# Patient Record
Sex: MALE | Race: WHITE | NOT HISPANIC OR LATINO | Employment: FULL TIME | URBAN - METROPOLITAN AREA
[De-identification: names, ages, dates, MRNs, and addresses within clinical notes are randomized per-mention and may not be internally consistent; named-entity substitution may affect disease eponyms.]

---

## 2022-05-09 ENCOUNTER — HOSPITAL ENCOUNTER (EMERGENCY)
Facility: MEDICAL CENTER | Age: 32
End: 2022-05-09
Attending: EMERGENCY MEDICINE
Payer: COMMERCIAL

## 2022-05-09 VITALS
BODY MASS INDEX: 30.52 KG/M2 | RESPIRATION RATE: 16 BRPM | DIASTOLIC BLOOD PRESSURE: 94 MMHG | WEIGHT: 178.79 LBS | SYSTOLIC BLOOD PRESSURE: 136 MMHG | HEIGHT: 64 IN | TEMPERATURE: 98.6 F | HEART RATE: 80 BPM | OXYGEN SATURATION: 97 %

## 2022-05-09 DIAGNOSIS — R10.11 RIGHT UPPER QUADRANT ABDOMINAL PAIN: ICD-10-CM

## 2022-05-09 LAB
ALBUMIN SERPL BCP-MCNC: 4.8 G/DL (ref 3.2–4.9)
ALBUMIN/GLOB SERPL: 1.7 G/DL
ALP SERPL-CCNC: 80 U/L (ref 30–99)
ALT SERPL-CCNC: 25 U/L (ref 2–50)
ANION GAP SERPL CALC-SCNC: 11 MMOL/L (ref 7–16)
APPEARANCE UR: CLEAR
AST SERPL-CCNC: 22 U/L (ref 12–45)
BASOPHILS # BLD AUTO: 0.7 % (ref 0–1.8)
BASOPHILS # BLD: 0.06 K/UL (ref 0–0.12)
BILIRUB SERPL-MCNC: 0.3 MG/DL (ref 0.1–1.5)
BILIRUB UR QL STRIP.AUTO: NEGATIVE
BUN SERPL-MCNC: 12 MG/DL (ref 8–22)
CALCIUM SERPL-MCNC: 9.6 MG/DL (ref 8.4–10.2)
CHLORIDE SERPL-SCNC: 99 MMOL/L (ref 96–112)
CO2 SERPL-SCNC: 26 MMOL/L (ref 20–33)
COLOR UR: YELLOW
CREAT SERPL-MCNC: 0.98 MG/DL (ref 0.5–1.4)
EOSINOPHIL # BLD AUTO: 0.14 K/UL (ref 0–0.51)
EOSINOPHIL NFR BLD: 1.6 % (ref 0–6.9)
ERYTHROCYTE [DISTWIDTH] IN BLOOD BY AUTOMATED COUNT: 44.1 FL (ref 35.9–50)
GFR SERPLBLD CREATININE-BSD FMLA CKD-EPI: 105 ML/MIN/1.73 M 2
GLOBULIN SER CALC-MCNC: 2.9 G/DL (ref 1.9–3.5)
GLUCOSE SERPL-MCNC: 89 MG/DL (ref 65–99)
GLUCOSE UR STRIP.AUTO-MCNC: NEGATIVE MG/DL
HCT VFR BLD AUTO: 48.9 % (ref 42–52)
HGB BLD-MCNC: 16 G/DL (ref 14–18)
IMM GRANULOCYTES # BLD AUTO: 0.04 K/UL (ref 0–0.11)
IMM GRANULOCYTES NFR BLD AUTO: 0.5 % (ref 0–0.9)
KETONES UR STRIP.AUTO-MCNC: NEGATIVE MG/DL
LEUKOCYTE ESTERASE UR QL STRIP.AUTO: NEGATIVE
LIPASE SERPL-CCNC: 14 U/L (ref 7–58)
LYMPHOCYTES # BLD AUTO: 3.1 K/UL (ref 1–4.8)
LYMPHOCYTES NFR BLD: 35.6 % (ref 22–41)
MCH RBC QN AUTO: 27.6 PG (ref 27–33)
MCHC RBC AUTO-ENTMCNC: 32.7 G/DL (ref 33.7–35.3)
MCV RBC AUTO: 84.5 FL (ref 81.4–97.8)
MICRO URNS: NORMAL
MONOCYTES # BLD AUTO: 0.73 K/UL (ref 0–0.85)
MONOCYTES NFR BLD AUTO: 8.4 % (ref 0–13.4)
NEUTROPHILS # BLD AUTO: 4.63 K/UL (ref 1.82–7.42)
NEUTROPHILS NFR BLD: 53.2 % (ref 44–72)
NITRITE UR QL STRIP.AUTO: NEGATIVE
NRBC # BLD AUTO: 0 K/UL
NRBC BLD-RTO: 0 /100 WBC
PH UR STRIP.AUTO: 6 [PH] (ref 5–8)
PLATELET # BLD AUTO: 314 K/UL (ref 164–446)
PMV BLD AUTO: 10.4 FL (ref 9–12.9)
POTASSIUM SERPL-SCNC: 4.4 MMOL/L (ref 3.6–5.5)
PROT SERPL-MCNC: 7.7 G/DL (ref 6–8.2)
PROT UR QL STRIP: NEGATIVE MG/DL
RBC # BLD AUTO: 5.79 M/UL (ref 4.7–6.1)
RBC UR QL AUTO: NEGATIVE
SODIUM SERPL-SCNC: 136 MMOL/L (ref 135–145)
SP GR UR STRIP.AUTO: 1.02
WBC # BLD AUTO: 8.7 K/UL (ref 4.8–10.8)

## 2022-05-09 PROCEDURE — 36415 COLL VENOUS BLD VENIPUNCTURE: CPT

## 2022-05-09 PROCEDURE — 99284 EMERGENCY DEPT VISIT MOD MDM: CPT

## 2022-05-09 PROCEDURE — 83690 ASSAY OF LIPASE: CPT

## 2022-05-09 PROCEDURE — 81003 URINALYSIS AUTO W/O SCOPE: CPT

## 2022-05-09 PROCEDURE — 85025 COMPLETE CBC W/AUTO DIFF WBC: CPT

## 2022-05-09 PROCEDURE — 80053 COMPREHEN METABOLIC PANEL: CPT

## 2022-05-09 RX ORDER — OMEPRAZOLE 40 MG/1
40 CAPSULE, DELAYED RELEASE ORAL DAILY
Qty: 30 CAPSULE | Refills: 0 | Status: SHIPPED | OUTPATIENT
Start: 2022-05-09 | End: 2022-05-09 | Stop reason: SDUPTHER

## 2022-05-09 RX ORDER — OMEPRAZOLE 40 MG/1
40 CAPSULE, DELAYED RELEASE ORAL DAILY
Qty: 30 CAPSULE | Refills: 0 | Status: SHIPPED | OUTPATIENT
Start: 2022-05-09

## 2022-05-09 NOTE — DISCHARGE INSTRUCTIONS
Gastritis    Avoid all ibuprofen and naproxen, aspirin and alcohol if possible for 4 weeks.  Take Prilosec 40 mg a day for 4 weeks.  Return for worsening pain, bleeding or pain and fever.  Followup with your doctor or GI if not better in 3-4 days.   Take maalox and Tylenol if needed until acid blocker takes effect.  If and when you resume alcohol use, try to limit it to no more than 4 drinks a day.    You had a borderline or high normal blood pressure reading today.  This does not necessarily mean you have hypertension.  Please followup with your/a primary physician for comprehensive blood pressure evaluation and yearly fasting cholesterol assessment.  BP Readings from Last 3 Encounters:   05/09/22 130/85         You have gastritis. Gastritis is an irritation of the stomach. This is often caused by medications, but can be from anything that bothers the stomach.   Other stomach irritants are:  Alcohol.  Caffeine.  Nicotine.  Spicy or acid foods.     Medications for pain and arthritis. Aspirin and other anti-inflammatory medicines such as ibuprofen (Advil), naproxen (Aleve), and ketoprofen (Orudis) can be highly irritating.  Emotional distress.     Symptoms of gastritis may include:  Abdominal pain. Indigestion. Nausea and or vomiting. Bleeding.      Some patients with chronic gastritis and ulcers have been infected by a germ. They may need special testing. Medications which kill germs can be used to cure this condition.    Treatment includes avoiding the substances mentioned above that are known to cause stomach trouble.    Medications used to treat gastritis can include:  Antacids.  Medicines to control vomiting.   Acid blocking medicines (Axid, Pepcid, Prevacid, Prilosec, Tagamet, Zantac).     Symptoms of gastritis usually improve within 2-3 days of starting treatment. Call your caregiver if you are not better in a few days.    Seek medical care if you:    Have increased stomach pain or chest pain.  Vomit  blood.  Faint or feel light headed. Can not keep fluids down.  Pass bloody or black stools.  Develop severe back pain.     Document Released: 12/18/2006  Document Re-Released: 06/30/2008  [email or web address]® Patient Information ©2008 [employer].

## 2022-05-09 NOTE — ED PROVIDER NOTES
ED Provider Note    CHIEF COMPLAINT  Chief Complaint   Patient presents with   • Abdominal Pain     Pt c/o intermittent Rt sided ABD Pn that's been going on for the past couple weeks now   • Nausea     Going on and off for the past couple days;   • Flank Pain     Bilateral; going on for past couple of weeks;       HPI  Dustin Nelson is a 31 y.o. male who presents with abdominal pain in the right mid abdomen present intermittently for 2 weeks and nearly constant the last 2 to 3 days.  Denies fever.  He has had nausea and vomited once yesterday.  He has soft stools but no diarrhea or constipation.  Denies dysuria urgency or frequency.  No hematuria.  No surgeries.  No pancreatitis gastritis or peptic ulcer disease.  Denies NSAID use.  He does drink about 6 alcoholic beverages per day.  Had intermittent back pain bilateral as well the last couple of weeks.    REVIEW OF SYSTEMS  Pertinent positives include: Abdominal pain, nausea, vomiting, back pain.  Pertinent negatives include: Fever, cough, sore throat, headache, body aches, rash, swelling, testicle pain.  10+ systems reviewed and negative.      PAST MEDICAL HISTORY  Denies    FAMILY HISTORY  Alcoholism    SOCIAL HISTORY  Social History     Tobacco Use   • Smoking status: Heavy Tobacco Smoker     Packs/day: 0.50     Types: Cigarettes   • Smokeless tobacco: Never Used   Vaping Use   • Vaping Use: Never used   Substance Use Topics   • Alcohol use: Yes     Comment: Daily;   • Drug use: Yes     Types: Inhaled     Comment: THC;     Social History     Substance and Sexual Activity   Drug Use Yes   • Types: Inhaled    Comment: THC;       SURGICAL HISTORY  History reviewed. No pertinent surgical history.    CURRENT MEDICATIONS  No current facility-administered medications for this encounter.     Current Outpatient Medications   Medication Sig Dispense Refill   • omeprazole (PRILOSEC) 40 MG delayed-release capsule Take 1 Capsule by mouth every day. 30 Capsule 0  "      ALLERGIES  No Known Allergies    PHYSICAL EXAM  VITAL SIGNS: /85   Pulse 76   Temp 36.9 °C (98.4 °F) (Temporal)   Resp 16   Ht 1.626 m (5' 4\")   Wt 81.1 kg (178 lb 12.7 oz)   SpO2 95%   BMI 30.69 kg/m²   Reviewed and afebrile  Constitutional: Well developed, Well nourished, clearly well-appearing.  HENT: Normocephalic, atraumatic, bilateral external ears normal, Wearing mask.   Eyes: PERRLA, conjunctiva pink, no scleral icterus.   Cardiovascular: Regular S1-S2 without murmur, rub, gallop.  No dependent edema or calf tenderness.  Respiratory: No rales, rhonchi, wheeze or cough.  Gastrointestinal: Soft, no tenderness in the right lower quadrant, extremely minimal tenderness right upper quadrant.  No rebound or guarding., nondistended, no organomegaly.  Skin: No erythema, no rash.   Genitourinary: Very minimal bilateral low costovertebral angle tenderness.   Neurologic: Alert & oriented x 3, cranial nerves 2-12 intact by passive exam.  No focal deficit noted.  Psychiatric: Affect normal, Judgment normal, Mood normal.     DIFFERENTIAL DIAGNOSIS:  Alcoholic gastritis, pancreatitis, cholecystitis, appendicitis, pyelonephritis, renal colic.    LABORATORY:  Results for orders placed or performed during the hospital encounter of 05/09/22   CBC WITH DIFFERENTIAL   Result Value Ref Range    WBC 8.7 4.8 - 10.8 K/uL    RBC 5.79 4.70 - 6.10 M/uL    Hemoglobin 16.0 14.0 - 18.0 g/dL    Hematocrit 48.9 42.0 - 52.0 %    MCV 84.5 81.4 - 97.8 fL    MCH 27.6 27.0 - 33.0 pg    MCHC 32.7 (L) 33.7 - 35.3 g/dL    RDW 44.1 35.9 - 50.0 fL    Platelet Count 314 164 - 446 K/uL    MPV 10.4 9.0 - 12.9 fL    Neutrophils-Polys 53.20 44.00 - 72.00 %    Lymphocytes 35.60 22.00 - 41.00 %    Monocytes 8.40 0.00 - 13.40 %    Eosinophils 1.60 0.00 - 6.90 %    Basophils 0.70 0.00 - 1.80 %    Immature Granulocytes 0.50 0.00 - 0.90 %    Nucleated RBC 0.00 /100 WBC    Neutrophils (Absolute) 4.63 1.82 - 7.42 K/uL    Lymphs (Absolute) 3.10 " 1.00 - 4.80 K/uL    Monos (Absolute) 0.73 0.00 - 0.85 K/uL    Eos (Absolute) 0.14 0.00 - 0.51 K/uL    Baso (Absolute) 0.06 0.00 - 0.12 K/uL    Immature Granulocytes (abs) 0.04 0.00 - 0.11 K/uL    NRBC (Absolute) 0.00 K/uL   COMP METABOLIC PANEL   Result Value Ref Range    Sodium 136 135 - 145 mmol/L    Potassium 4.4 3.6 - 5.5 mmol/L    Chloride 99 96 - 112 mmol/L    Co2 26 20 - 33 mmol/L    Anion Gap 11.0 7.0 - 16.0    Glucose 89 65 - 99 mg/dL    Bun 12 8 - 22 mg/dL    Creatinine 0.98 0.50 - 1.40 mg/dL    Calcium 9.6 8.4 - 10.2 mg/dL    AST(SGOT) 22 12 - 45 U/L    ALT(SGPT) 25 2 - 50 U/L    Alkaline Phosphatase 80 30 - 99 U/L    Total Bilirubin 0.3 0.1 - 1.5 mg/dL    Albumin 4.8 3.2 - 4.9 g/dL    Total Protein 7.7 6.0 - 8.2 g/dL    Globulin 2.9 1.9 - 3.5 g/dL    A-G Ratio 1.7 g/dL   LIPASE   Result Value Ref Range    Lipase 14 7 - 58 U/L   URINALYSIS    Specimen: Urine, Clean Catch; Blood   Result Value Ref Range    Color Yellow     Character Clear     Specific Gravity 1.020 <1.035    Ph 6.0 5.0 - 8.0    Glucose Negative Negative mg/dL    Ketones Negative Negative mg/dL    Protein Negative Negative mg/dL    Bilirubin Negative Negative    Nitrite Negative Negative    Leukocyte Esterase Negative Negative    Occult Blood Negative Negative    Micro Urine Req see below    ESTIMATED GFR   Result Value Ref Range    GFR (CKD-EPI) 105 >60 mL/min/1.73 m 2         COURSE & MEDICAL DECISION MAKING  Well-appearing patient presents with right mid abdominal pain.  He has no fever, extremely minimal tenderness now, no McBurney's point tenderness, no leukocytosis and all of his labs are normal.  Imaging study considered but ultrasound and CT unlikely to  given this presentation.  It is most likely the patient has an alcohol induced gastritis.  I will treat him empirically for this and if he does not improve in 3 to 4 days he is to return.  He is also to return for new fever, bloody stool or emesis, severe pain or  ill appearance.    PLAN:  Current Discharge Medication List      START taking these medications    Details   omeprazole (PRILOSEC) 40 MG delayed-release capsule Take 1 Capsule by mouth every day.  Qty: 30 Capsule, Refills: 0    Associated Diagnoses: Right upper quadrant abdominal pain           Abstain from NSAIDs and alcohol 4 weeks then limit alcohol to no more than 4 beverages daily  Gastritis handout given  Return for fever, GI bleed, severe pain, ill appearance, failure to improve    [facility]  [address]  [facility] [address]  Schedule an appointment as soon as possible for a visit   As needed if not better one week    CONDITION: Stable.    FINAL IMPRESSION  1. Right upper quadrant abdominal pain    2.      Alcoholic gastritis, possible      Electronically signed by: Edward Abreu M.D., 5/9/2022 4:28 PM

## 2022-05-09 NOTE — ED NOTES
"Chief Complaint   Patient presents with   • Abdominal Pain     Pt c/o intermittent Rt sided ABD Pn that's been going on for the past couple weeks now   • Nausea     Going on and off for the past couple days;   • Flank Pain     Bilateral; going on for past couple of weeks;     ED Triage Vitals [05/09/22 1305]   Enc Vitals Group      Blood Pressure 130/85      Pulse 76      Respiration 16      Temperature 36.9 °C (98.4 °F)      Temp src Temporal      Pulse Oximetry 95 %      Weight 81.1 kg (178 lb 12.7 oz)      Height 1.626 m (5' 4\")      Head Circumference       Peak Flow       Pain Score       Pain Loc       Pain Edu?       Excl. in GC?        "

## 2022-05-10 ENCOUNTER — HOSPITAL ENCOUNTER (EMERGENCY)
Facility: MEDICAL CENTER | Age: 32
End: 2022-05-10

## 2022-09-01 ENCOUNTER — NON-PROVIDER VISIT (OUTPATIENT)
Dept: OCCUPATIONAL MEDICINE | Facility: CLINIC | Age: 32
End: 2022-09-01

## 2022-09-01 DIAGNOSIS — Z02.1 PRE-EMPLOYMENT DRUG SCREENING: ICD-10-CM

## 2022-09-01 LAB
AMP AMPHETAMINE: NORMAL
COC COCAINE: NORMAL
INT CON NEG: NORMAL
INT CON POS: NORMAL
MET METHAMPHETAMINES: NORMAL
OPI OPIATES: NORMAL
PCP PHENCYCLIDINE: NORMAL
POC DRUG COMMENT 753798-OCCUPATIONAL HEALTH: NORMAL
THC: NORMAL

## 2022-09-01 PROCEDURE — 80305 DRUG TEST PRSMV DIR OPT OBS: CPT | Performed by: NURSE PRACTITIONER

## 2023-12-09 ENCOUNTER — OFFICE VISIT (OUTPATIENT)
Dept: URGENT CARE | Facility: PHYSICIAN GROUP | Age: 33
End: 2023-12-09
Payer: COMMERCIAL

## 2023-12-09 VITALS
WEIGHT: 175 LBS | OXYGEN SATURATION: 98 % | HEIGHT: 64 IN | BODY MASS INDEX: 29.88 KG/M2 | RESPIRATION RATE: 12 BRPM | HEART RATE: 73 BPM | DIASTOLIC BLOOD PRESSURE: 84 MMHG | TEMPERATURE: 97.3 F | SYSTOLIC BLOOD PRESSURE: 136 MMHG

## 2023-12-09 DIAGNOSIS — M54.50 ACUTE BILATERAL LOW BACK PAIN WITHOUT SCIATICA: ICD-10-CM

## 2023-12-09 PROCEDURE — 3079F DIAST BP 80-89 MM HG: CPT | Performed by: PHYSICIAN ASSISTANT

## 2023-12-09 PROCEDURE — 3075F SYST BP GE 130 - 139MM HG: CPT | Performed by: PHYSICIAN ASSISTANT

## 2023-12-09 PROCEDURE — 99203 OFFICE O/P NEW LOW 30 MIN: CPT | Performed by: PHYSICIAN ASSISTANT

## 2023-12-09 ASSESSMENT — FIBROSIS 4 INDEX: FIB4 SCORE: 0.46

## 2023-12-09 NOTE — PROGRESS NOTES
"Subjective:   Dustin Nelson is a 33 y.o. male who presents for Back Pain (Started Wednesday back pain flared up from past back injury, needing doctors note to go back to work )      HPI  The patient presents to the Urgent Care requesting a doctor's note to return to work.  History of chronic intermittent low back pain from previous injuries.  Flared up recently and his work is requiring evaluation.  Still tight to his right lower back but has significantly improved and he believes will be resolved in a couple days.  Tightness to bilateral lower back. No significant new injury but may have tweaked it. It does not work until the next few days.  Denies any pain radiation to legs, numbness, tingling, weakness, loss of bowel or bladder control.        No past medical history on file.  No Known Allergies     Objective:     /84 (BP Location: Right arm, Patient Position: Sitting, BP Cuff Size: Adult)   Pulse 73   Temp 36.3 °C (97.3 °F) (Temporal)   Resp 12   Ht 1.626 m (5' 4\")   Wt 79.4 kg (175 lb)   SpO2 98%   BMI 30.04 kg/m²     Physical Exam  Vitals reviewed.   Constitutional:       General: He is not in acute distress.     Appearance: Normal appearance. He is not ill-appearing or toxic-appearing.   Eyes:      Conjunctiva/sclera: Conjunctivae normal.   Cardiovascular:      Rate and Rhythm: Normal rate.   Pulmonary:      Effort: Pulmonary effort is normal.   Musculoskeletal:      Cervical back: Neck supple.      Comments: Back: Full range of flexion, extension, rotation, lateralization.   Negative TTP.   Negative midline tenderness, bony tenderness, crepitus, deformities, or step-offs.  Negative straight leg raise  Neurological: strength 5/5 bilateral lower extremities.  Normal gait.          Skin:     General: Skin is warm and dry.   Neurological:      General: No focal deficit present.      Mental Status: He is oriented to person, place, and time.   Psychiatric:         Mood and Affect: Mood " normal.         Behavior: Behavior normal.         Diagnosis and associated orders:     1. Acute bilateral low back pain without sciatica       Comments/MDM:     Patient here for an evaluation required by employer and returned to work note.  See full history above.  Pain is improving and resolving.  No other complaints.  No red flag symptoms.  He is well-appearing in no acute distress.  Exam findings benign.  Massage, ice and/or heat which ever feels better, and Ibuprofen per manufacture's instructions. Encouraged walking, stretching, and range of motion exercises as tolerated. Avoid sitting or laying down for long periods of time except for at night during sleep.  Work note provided to return on 12/11/23.      I personally reviewed prior external notes and test results pertinent to today's visit. Pathogenesis of diagnosis discussed including typical length and natural progression. Supportive care, natural history, differential diagnoses, and indications for immediate follow-up discussed. Patient expresses understanding and agrees to plan. Patient denies any other questions or concerns.     Follow-up with the primary care physician for recheck, reevaluation, and consideration of further management.    Please note that this dictation was created using voice recognition software. I have made a reasonable attempt to correct obvious errors, but I expect that there are errors of grammar and possibly content that I did not discover before finalizing the note.    This note was electronically signed by Randy Inman PA-C

## 2023-12-09 NOTE — LETTER
December 9, 2023         Patient: Dustin Nelson   YOB: 1990   Date of Visit: 12/9/2023           To Whom it May Concern:    Dustin Nelson was seen in my clinic on 12/9/2023.    If you have any questions or concerns, please don't hesitate to call.        Sincerely,           Randy Inman P.A.-C.  Electronically Signed

## 2024-09-04 ENCOUNTER — OFFICE VISIT (OUTPATIENT)
Dept: URGENT CARE | Facility: CLINIC | Age: 34
End: 2024-09-04
Payer: COMMERCIAL

## 2024-09-04 VITALS
DIASTOLIC BLOOD PRESSURE: 84 MMHG | HEART RATE: 86 BPM | HEIGHT: 64 IN | BODY MASS INDEX: 31.76 KG/M2 | RESPIRATION RATE: 18 BRPM | WEIGHT: 186 LBS | TEMPERATURE: 97.9 F | SYSTOLIC BLOOD PRESSURE: 120 MMHG | OXYGEN SATURATION: 97 %

## 2024-09-04 DIAGNOSIS — S39.012A STRAIN OF LUMBAR REGION, INITIAL ENCOUNTER: ICD-10-CM

## 2024-09-04 PROCEDURE — 3079F DIAST BP 80-89 MM HG: CPT | Performed by: NURSE PRACTITIONER

## 2024-09-04 PROCEDURE — 99213 OFFICE O/P EST LOW 20 MIN: CPT | Performed by: NURSE PRACTITIONER

## 2024-09-04 PROCEDURE — 3074F SYST BP LT 130 MM HG: CPT | Performed by: NURSE PRACTITIONER

## 2024-09-04 RX ORDER — MELOXICAM 15 MG/1
15 TABLET ORAL DAILY
Qty: 30 TABLET | Refills: 0 | Status: SHIPPED | OUTPATIENT
Start: 2024-09-04

## 2024-09-04 RX ORDER — KETOROLAC TROMETHAMINE 15 MG/ML
15 INJECTION, SOLUTION INTRAMUSCULAR; INTRAVENOUS ONCE
Status: COMPLETED | OUTPATIENT
Start: 2024-09-04 | End: 2024-09-04

## 2024-09-04 RX ORDER — CYCLOBENZAPRINE HCL 5 MG
5 TABLET ORAL 3 TIMES DAILY PRN
Qty: 30 TABLET | Refills: 0 | Status: SHIPPED | OUTPATIENT
Start: 2024-09-04

## 2024-09-04 RX ADMIN — KETOROLAC TROMETHAMINE 15 MG: 15 INJECTION, SOLUTION INTRAMUSCULAR; INTRAVENOUS at 15:39

## 2024-09-04 ASSESSMENT — ENCOUNTER SYMPTOMS
BOWEL INCONTINENCE: 0
TINGLING: 0
MYALGIAS: 0
FALLS: 0
WEAKNESS: 0
NECK PAIN: 0
NUMBNESS: 0
FLANK PAIN: 0
PERIANAL NUMBNESS: 0
BACK PAIN: 1

## 2024-09-04 NOTE — LETTER
September 4, 2024         Patient: Dustin Michaud   YOB: 1990   Date of Visit: 9/4/2024           To Whom it May Concern:    Dustin Michaud was seen in my clinic on 9/4/2024. He may return to work on 9/8/24.    If you have any questions or concerns, please don't hesitate to call.        Sincerely,           KAYLA Hernandez.  Electronically Signed

## 2024-09-04 NOTE — PROGRESS NOTES
Subjective:   Dustin Michaud is a 34 y.o. male who presents for Back Pain (Injured back while on top of a ladder.moved the wrong way x3 days)      Back Pain  This is a new problem. Episode onset: 3 days; injured at home when he was coming off a ladder strained the back. The problem occurs constantly. The problem has been gradually worsening since onset. The pain is present in the lumbar spine. The quality of the pain is described as aching, burning and cramping. The pain does not radiate. The pain is severe. The pain is The same all the time. The symptoms are aggravated by twisting, standing, position and bending. Pertinent negatives include no bladder incontinence, bowel incontinence, numbness, pelvic pain, perianal numbness, tingling or weakness. He has tried analgesics for the symptoms. The treatment provided no relief.       Review of Systems   Gastrointestinal:  Negative for bowel incontinence.   Genitourinary:  Negative for bladder incontinence, flank pain and pelvic pain.   Musculoskeletal:  Positive for back pain. Negative for falls, joint pain, myalgias and neck pain.   Neurological:  Negative for tingling, weakness and numbness.       Medications:    cyclobenzaprine  meloxicam    Allergies: Patient has no known allergies.    Problem List: Dustin Michaud does not have a problem list on file.    Surgical History:  No past surgical history on file.    Past Social Hx: Dustin Michaud  reports that he has quit smoking. His smoking use included cigarettes. He has never used smokeless tobacco. He reports current alcohol use. He reports current drug use. Drugs: Inhaled and Marijuana.     Past Family Hx:  Dustin Michaud family history is not on file.     Problem list, medications, and allergies reviewed by myself today in Epic.     Objective:     /84 (BP Location: Left arm, Patient Position: Sitting, BP Cuff Size: Adult)   Pulse 86   Temp 36.6 °C (97.9 °F)  "(Temporal)   Resp 18   Ht 1.626 m (5' 4\")   Wt 84.4 kg (186 lb)   SpO2 97%   BMI 31.93 kg/m²     Physical Exam  Constitutional:       Appearance: Normal appearance. He is not ill-appearing or toxic-appearing.   HENT:      Head: Normocephalic.      Right Ear: External ear normal.      Left Ear: External ear normal.      Nose: Nose normal.      Mouth/Throat:      Lips: Pink.   Eyes:      General: Lids are normal.   Pulmonary:      Effort: Pulmonary effort is normal. No accessory muscle usage.   Musculoskeletal:      Cervical back: Normal and full passive range of motion without pain.      Thoracic back: Normal.      Lumbar back: Spasms and tenderness present. No swelling, deformity or bony tenderness. Decreased range of motion. Negative right straight leg raise test and negative left straight leg raise test.        Back:    Neurological:      Mental Status: He is alert and oriented to person, place, and time.   Psychiatric:         Mood and Affect: Mood normal.         Thought Content: Thought content normal.         Assessment/Plan:     Diagnosis and associated orders:     1. Strain of lumbar region, initial encounter  ketorolac (Toradol) 15 MG/ML injection 15 mg    cyclobenzaprine (FLEXERIL) 5 mg tablet    meloxicam (MOBIC) 15 MG tablet    Referral to Physical Therapy         Comments/MDM:     I personally reviewed prior external notes and prior test results pertinent to today's visit.  No signs of cauda equina no midline tenderness.  Encouraged range of motion, heat, stage, stretching.  Work note provided  Discussed management options, risks and benefits, and alternatives to treatment plan agreed upon.   Red flags discussed and indications to immediately call 911 or present to the Emergency Department.   Supportive care, differential diagnoses, and indications for immediate follow-up discussed with patient.    Patient expresses understanding and agrees to plan. Patient denies any other questions or concerns. "                Please note that this dictation was created using voice recognition software. I have made a reasonable attempt to correct obvious errors, but I expect that there are errors of grammar and possibly content that I did not discover before finalizing the note.    This note was electronically signed by Ranulfo HOBSON.

## 2025-06-11 ENCOUNTER — HOSPITAL ENCOUNTER (EMERGENCY)
Facility: MEDICAL CENTER | Age: 35
End: 2025-06-11
Attending: EMERGENCY MEDICINE
Payer: COMMERCIAL

## 2025-06-11 VITALS
RESPIRATION RATE: 16 BRPM | WEIGHT: 179.68 LBS | DIASTOLIC BLOOD PRESSURE: 81 MMHG | TEMPERATURE: 98 F | HEIGHT: 64 IN | HEART RATE: 80 BPM | BODY MASS INDEX: 30.67 KG/M2 | OXYGEN SATURATION: 98 % | SYSTOLIC BLOOD PRESSURE: 142 MMHG

## 2025-06-11 DIAGNOSIS — R10.12 LEFT UPPER QUADRANT ABDOMINAL PAIN: Primary | ICD-10-CM

## 2025-06-11 DIAGNOSIS — R11.2 NAUSEA AND VOMITING, UNSPECIFIED VOMITING TYPE: ICD-10-CM

## 2025-06-11 DIAGNOSIS — K92.0 HEMATEMESIS WITH NAUSEA: ICD-10-CM

## 2025-06-11 LAB
ALBUMIN SERPL BCP-MCNC: 4.7 G/DL (ref 3.2–4.9)
ALBUMIN/GLOB SERPL: 1.8 G/DL
ALP SERPL-CCNC: 71 U/L (ref 30–99)
ALT SERPL-CCNC: 23 U/L (ref 2–50)
ANION GAP SERPL CALC-SCNC: 12 MMOL/L (ref 7–16)
APPEARANCE UR: CLEAR
AST SERPL-CCNC: 25 U/L (ref 12–45)
BASOPHILS # BLD AUTO: 0.6 % (ref 0–1.8)
BASOPHILS # BLD: 0.04 K/UL (ref 0–0.12)
BILIRUB SERPL-MCNC: 0.5 MG/DL (ref 0.1–1.5)
BILIRUB UR QL STRIP.AUTO: NEGATIVE
BUN SERPL-MCNC: 12 MG/DL (ref 8–22)
CALCIUM ALBUM COR SERPL-MCNC: 8.8 MG/DL (ref 8.5–10.5)
CALCIUM SERPL-MCNC: 9.4 MG/DL (ref 8.5–10.5)
CHLORIDE SERPL-SCNC: 103 MMOL/L (ref 96–112)
CO2 SERPL-SCNC: 21 MMOL/L (ref 20–33)
COLOR UR: YELLOW
CREAT SERPL-MCNC: 0.91 MG/DL (ref 0.5–1.4)
EOSINOPHIL # BLD AUTO: 0.05 K/UL (ref 0–0.51)
EOSINOPHIL NFR BLD: 0.7 % (ref 0–6.9)
ERYTHROCYTE [DISTWIDTH] IN BLOOD BY AUTOMATED COUNT: 40.6 FL (ref 35.9–50)
GFR SERPLBLD CREATININE-BSD FMLA CKD-EPI: 113 ML/MIN/1.73 M 2
GLOBULIN SER CALC-MCNC: 2.6 G/DL (ref 1.9–3.5)
GLUCOSE SERPL-MCNC: 103 MG/DL (ref 65–99)
GLUCOSE UR STRIP.AUTO-MCNC: NEGATIVE MG/DL
HCT VFR BLD AUTO: 43.6 % (ref 42–52)
HGB BLD-MCNC: 14.5 G/DL (ref 14–18)
IMM GRANULOCYTES # BLD AUTO: 0.01 K/UL (ref 0–0.11)
IMM GRANULOCYTES NFR BLD AUTO: 0.1 % (ref 0–0.9)
KETONES UR STRIP.AUTO-MCNC: ABNORMAL MG/DL
LEUKOCYTE ESTERASE UR QL STRIP.AUTO: NEGATIVE
LIPASE SERPL-CCNC: 14 U/L (ref 11–82)
LYMPHOCYTES # BLD AUTO: 2.17 K/UL (ref 1–4.8)
LYMPHOCYTES NFR BLD: 31.9 % (ref 22–41)
MCH RBC QN AUTO: 27.3 PG (ref 27–33)
MCHC RBC AUTO-ENTMCNC: 33.3 G/DL (ref 32.3–36.5)
MCV RBC AUTO: 82.1 FL (ref 81.4–97.8)
MICRO URNS: ABNORMAL
MONOCYTES # BLD AUTO: 0.51 K/UL (ref 0–0.85)
MONOCYTES NFR BLD AUTO: 7.5 % (ref 0–13.4)
NEUTROPHILS # BLD AUTO: 4.03 K/UL (ref 1.82–7.42)
NEUTROPHILS NFR BLD: 59.2 % (ref 44–72)
NITRITE UR QL STRIP.AUTO: NEGATIVE
NRBC # BLD AUTO: 0 K/UL
NRBC BLD-RTO: 0 /100 WBC (ref 0–0.2)
PH UR STRIP.AUTO: 5.5 [PH] (ref 5–8)
PLATELET # BLD AUTO: 312 K/UL (ref 164–446)
PMV BLD AUTO: 9.9 FL (ref 9–12.9)
POTASSIUM SERPL-SCNC: 3.9 MMOL/L (ref 3.6–5.5)
PROT SERPL-MCNC: 7.3 G/DL (ref 6–8.2)
PROT UR QL STRIP: NEGATIVE MG/DL
RBC # BLD AUTO: 5.31 M/UL (ref 4.7–6.1)
RBC UR QL AUTO: NEGATIVE
SODIUM SERPL-SCNC: 136 MMOL/L (ref 135–145)
SP GR UR STRIP.AUTO: 1.02
UROBILINOGEN UR STRIP.AUTO-MCNC: 1 EU/DL
WBC # BLD AUTO: 6.8 K/UL (ref 4.8–10.8)

## 2025-06-11 PROCEDURE — 80053 COMPREHEN METABOLIC PANEL: CPT

## 2025-06-11 PROCEDURE — 99284 EMERGENCY DEPT VISIT MOD MDM: CPT

## 2025-06-11 PROCEDURE — 36415 COLL VENOUS BLD VENIPUNCTURE: CPT

## 2025-06-11 PROCEDURE — 700102 HCHG RX REV CODE 250 W/ 637 OVERRIDE(OP): Performed by: EMERGENCY MEDICINE

## 2025-06-11 PROCEDURE — 85025 COMPLETE CBC W/AUTO DIFF WBC: CPT

## 2025-06-11 PROCEDURE — 83690 ASSAY OF LIPASE: CPT

## 2025-06-11 PROCEDURE — A9270 NON-COVERED ITEM OR SERVICE: HCPCS | Performed by: EMERGENCY MEDICINE

## 2025-06-11 PROCEDURE — 81003 URINALYSIS AUTO W/O SCOPE: CPT

## 2025-06-11 RX ORDER — OMEPRAZOLE 40 MG/1
40 CAPSULE, DELAYED RELEASE ORAL DAILY
Qty: 30 CAPSULE | Refills: 0 | Status: SHIPPED | OUTPATIENT
Start: 2025-06-11

## 2025-06-11 RX ORDER — ONDANSETRON 4 MG/1
4 TABLET, ORALLY DISINTEGRATING ORAL EVERY 8 HOURS PRN
Qty: 8 TABLET | Refills: 0 | Status: SHIPPED | OUTPATIENT
Start: 2025-06-11

## 2025-06-11 RX ORDER — OMEPRAZOLE 20 MG/1
40 CAPSULE, DELAYED RELEASE ORAL ONCE
Status: COMPLETED | OUTPATIENT
Start: 2025-06-11 | End: 2025-06-11

## 2025-06-11 RX ADMIN — OMEPRAZOLE 40 MG: 20 CAPSULE, DELAYED RELEASE ORAL at 11:44

## 2025-06-11 NOTE — ED NOTES
Discussed patient going to AA and patient agreeable - reports he is going to a meeting today. Patient verbalized understanding to plan of care and discharge information. Patient in stable condition. Patient ambulated out of ED to person vehicle with stable gait.

## 2025-06-11 NOTE — ED TRIAGE NOTES
"BP (!) 159/94   Pulse 82   Temp 36.9 °C (98.4 °F) (Temporal)   Resp 18   Ht 1.626 m (5' 4\")   Wt 81.5 kg (179 lb 10.8 oz)   SpO2 97%   BMI 30.84 kg/m²   Chief Complaint   Patient presents with    N/V     Started yesterday  woke up not feeling well  started vomiting dark burgundy color vomit   Hx of daily drinking   no Hx of GI bleed however per pt     Abdominal Pain     Started yesterday as well  L U Q pain         Comes in by himself     per pt last drink was this past Sunday      "

## 2025-06-11 NOTE — ED PROVIDER NOTES
ED Provider Note    Scribed for Edward Abreu M.D. by Jaziel More. 6/11/2025  11:22 AM    Primary care provider: Pcp Pt States None    CHIEF COMPLAINT  Chief Complaint   Patient presents with    N/V     Started yesterday  woke up not feeling well  started vomiting dark burgundy color vomit   Hx of daily drinking   no Hx of GI bleed however per pt     Abdominal Pain     Started yesterday as well  L U Q pain           EXTERNAL RECORDS REVIEWED  Clinic note from September 2024 reviewed for back pain.  He was diagnosed with a strain.    HPI    Dsutin Danny Michaud is a 34 y.o. male who presents to the Emergency Department for evaluation of possible hematemesis onset yesterday. He reports a total of eight episodes of N/V. He describes his vomit is a consistent burgundy color throughout all of his episodes. He denies melena. Since his symptoms have developed, the patient notes not having any bowel movements. He reports associated intermittent left upper quadrant abdominal pain but denies any cough, fever or sore throat. He reports alcohol use for the past 15 years, drinking at least a few shots per day. Additionally, the patient mentions he does not take any medications. No alleviation factors noted. No blood thinner use. He denies any history of stomach ulcers or gastritis. He notes no sick residents at home. He denies any recent travel.     LIMITATION TO HISTORY   None    OUTSIDE HISTORIAN(S):  None    PAST MEDICAL HISTORY  History reviewed.   He denies any history of stomach ulcers or gastritis.    FAMILY HISTORY  No family history contributed by patient.     SOCIAL HISTORY  Social History[1]  Social History     Substance and Sexual Activity   Drug Use Yes    Types: Inhaled, Marijuana    Comment: THC       SURGICAL HISTORY  Past Surgical History[2]    CURRENT MEDICATIONS  Current Medications[3]    ALLERGIES  Allergies[4]    PHYSICAL EXAM  VITAL SIGNS: BP (!) 159/94   Pulse 82   Temp 36.9 °C (98.4 °F) (Temporal)   " Resp 18   Ht 1.626 m (5' 4\")   Wt 81.5 kg (179 lb 10.8 oz)   SpO2 97%   BMI 30.84 kg/m²   Reviewed and hypertensive  Constitutional: Well developed, Well nourished.  HENT: Normocephalic, atraumatic, bilateral external ears normal, No intraoral erythema, edema, exudate  Eyes: PERRLA, conjunctiva pink, no scleral icterus.   Cardiovascular: Regular rate and rhythm. No murmurs, rubs or gallops.  No dependent edema or calf tenderness  Respiratory: Lungs clear to auscultation bilaterally. No wheezes, rales, or rhonchi.  Abdominal:  Left upper quadrant tenderness and Upper gastric tenderness, Abdomen soft, non distended. No rebound, or guarding.    Skin: No erythema, no rash. No wounds or bruising.  Genitourinary: No costovertebral angle tenderness.   Musculoskeletal: no deformities.   Neurologic: Alert & oriented x 3, cranial nerves 2-12 intact by passive exam.  No focal deficit noted.  Psychiatric: Affect normal, Judgment normal, Mood normal.     LABS Ordered and Reviewed by Me:  Results for orders placed or performed during the hospital encounter of 06/11/25   CBC WITH DIFFERENTIAL    Collection Time: 06/11/25 10:50 AM   Result Value Ref Range    WBC 6.8 4.8 - 10.8 K/uL    RBC 5.31 4.70 - 6.10 M/uL    Hemoglobin 14.5 14.0 - 18.0 g/dL    Hematocrit 43.6 42.0 - 52.0 %    MCV 82.1 81.4 - 97.8 fL    MCH 27.3 27.0 - 33.0 pg    MCHC 33.3 32.3 - 36.5 g/dL    RDW 40.6 35.9 - 50.0 fL    Platelet Count 312 164 - 446 K/uL    MPV 9.9 9.0 - 12.9 fL    Neutrophils-Polys 59.20 44.00 - 72.00 %    Lymphocytes 31.90 22.00 - 41.00 %    Monocytes 7.50 0.00 - 13.40 %    Eosinophils 0.70 0.00 - 6.90 %    Basophils 0.60 0.00 - 1.80 %    Immature Granulocytes 0.10 0.00 - 0.90 %    Nucleated RBC 0.00 0.00 - 0.20 /100 WBC    Neutrophils (Absolute) 4.03 1.82 - 7.42 K/uL    Lymphs (Absolute) 2.17 1.00 - 4.80 K/uL    Monos (Absolute) 0.51 0.00 - 0.85 K/uL    Eos (Absolute) 0.05 0.00 - 0.51 K/uL    Baso (Absolute) 0.04 0.00 - 0.12 K/uL    " Immature Granulocytes (abs) 0.01 0.00 - 0.11 K/uL    NRBC (Absolute) 0.00 K/uL   COMP METABOLIC PANEL    Collection Time: 06/11/25 10:50 AM   Result Value Ref Range    Sodium 136 135 - 145 mmol/L    Potassium 3.9 3.6 - 5.5 mmol/L    Chloride 103 96 - 112 mmol/L    Co2 21 20 - 33 mmol/L    Anion Gap 12.0 7.0 - 16.0    Glucose 103 (H) 65 - 99 mg/dL    Bun 12 8 - 22 mg/dL    Creatinine 0.91 0.50 - 1.40 mg/dL    Calcium 9.4 8.5 - 10.5 mg/dL    Correct Calcium 8.8 8.5 - 10.5 mg/dL    AST(SGOT) 25 12 - 45 U/L    ALT(SGPT) 23 2 - 50 U/L    Alkaline Phosphatase 71 30 - 99 U/L    Total Bilirubin 0.5 0.1 - 1.5 mg/dL    Albumin 4.7 3.2 - 4.9 g/dL    Total Protein 7.3 6.0 - 8.2 g/dL    Globulin 2.6 1.9 - 3.5 g/dL    A-G Ratio 1.8 g/dL   LIPASE    Collection Time: 06/11/25 10:50 AM   Result Value Ref Range    Lipase 14 11 - 82 U/L   ESTIMATED GFR    Collection Time: 06/11/25 10:50 AM   Result Value Ref Range    GFR (CKD-EPI) 113 >60 mL/min/1.73 m 2       Interpretations:    Pulse Oximetry: Normal at 97% on room air.     COURSE & MEDICAL DECISION MAKING  11:22 AM - Patient seen and examined at bedside.     INTERVENTIONS BY ME:  Medications   omeprazole (PriLOSEC) capsule 40 mg (has no administration in time range)       11:35 AM - On reassessment response to intervention, I informed the patient about his lab results. I referred the patient to GI and PCP for follow up. I also instructed the patient to take the prescribed omeprazole and Zofran as needed. Patient demonstrates the importance of withholding from alcohol usage for the next month. I discussed plan for discharge and follow up as outlined below. The patient is stable for discharge at this time and will return for any new or worsening symptoms. Patient verbalizes understanding and support with my plan for discharge.      ASSESSMENT, COURSE AND PLAN:  PROBLEMS EVALUATED THIS VISIT:    Well-appearing patient presents with left upper quadrant abdominal pain nausea and  vomiting with burgundy emesis.  Given his history of excessive alcohol use he likely has a gastritis with some hematemesis.  Fortunately he is not anemic.  Surprisingly he also appears to have a normal liver without evidence of cirrhosis or alcoholic hepatitis.  He has no primary although he is insured.  He does not use NSAIDs.  Given absence of diarrhea his left upper quadrant pain is not likely due to his colon.  Imaging considered but unlikely to .  Rectal exam considered but given that his hemoglobin is completely normal it will probably not .    DISPOSITION AND DISCUSSIONS    RISK:  Moderate given need for outpatient management and prescription medication.     PLAN:  New Prescriptions    OMEPRAZOLE (PRILOSEC) 40 MG DELAYED-RELEASE CAPSULE    Take 1 Capsule by mouth every day.    ONDANSETRON (ZOFRAN ODT) 4 MG TABLET DISPERSIBLE    Take 1 Tablet by mouth every 8 hours as needed for Nausea/Vomiting.       Abstain from alcohol and NSAIDs 1 month    Gastritis handout given    Return for worsening pain, increased bleeding, dizziness, pain or fever.     Followup:  [facility]  [address]  [facility] [address]  [phone]  Schedule an appointment as soon as possible for a visit   with a new primary    [facility]  [address] [place] [id number] [phone]        [facility]  [address]  [facility] [address]  [phone]          CONDITION: Good.     FINAL IMPRESSION  1. Left upper quadrant abdominal pain    2. Nausea and vomiting, unspecified vomiting type    3. Hematemesis with nausea          Jaziel YAÑEZ (Miguel Angel), am scribing for, and in the presence of, Edward Abreu M.D..    Electronically signed by: Jaziel Dean), 6/11/2025    Edward YAÑEZ M.D. personally performed the services described in this documentation, as scribed by Jaziel More in my presence, and it is both accurate  and complete.     The note accurately reflects work and decisions made by me.  Edward Abreu M.D.  6/11/2025  12:03 PM             [1]   Social History  Tobacco Use    Smoking status: Former     Current packs/day: 0.50     Types: Cigarettes    Smokeless tobacco: Never   Vaping Use    Vaping status: Never Used   Substance Use Topics    Alcohol use: Yes     Comment: rarely    Drug use: Yes     Types: Inhaled, Marijuana     Comment: THC   [2] History reviewed. No pertinent surgical history.  [3]   Current Facility-Administered Medications:     omeprazole (PriLOSEC) capsule 40 mg, 40 mg, Oral, Once, Edward Abreu M.D.    Current Outpatient Medications:     omeprazole (PRILOSEC) 40 MG delayed-release capsule, Take 1 Capsule by mouth every day., Disp: 30 Capsule, Rfl: 0    ondansetron (ZOFRAN ODT) 4 MG TABLET DISPERSIBLE, Take 1 Tablet by mouth every 8 hours as needed for Nausea/Vomiting., Disp: 8 Tablet, Rfl: 0    cyclobenzaprine (FLEXERIL) 5 mg tablet, Take 1 Tablet by mouth 3 times a day as needed for Moderate Pain or Muscle Spasms., Disp: 30 Tablet, Rfl: 0    meloxicam (MOBIC) 15 MG tablet, Take 1 Tablet by mouth every day., Disp: 30 Tablet, Rfl: 0  [4] No Known Allergies

## 2025-06-11 NOTE — DISCHARGE INSTRUCTIONS
Gastritis    Avoid all ibuprofen and naproxen, aspirin and alcohol for a month.  Take Prilosec 40 mg a day for 4 weeks.  Return for worsening pain, increased bleeding, dizziness, or pain and fever.  Followup with your doctor or GI if not better in 3-4 days.   Take maalox and Tylenol if needed until acid blocker takes effect.  Take Zofran if needed for nausea and vomiting.      You have gastritis. Gastritis is an irritation of the stomach. This is often caused by medications, but can be from anything that bothers the stomach.   Other stomach irritants are:  Alcohol.  Caffeine.  Nicotine.  Spicy or acid foods.     Medications for pain and arthritis. Aspirin and other anti-inflammatory medicines such as ibuprofen (Advil), naproxen (Aleve), and ketoprofen (Orudis) can be highly irritating.  Emotional distress.     Symptoms of gastritis may include:  Abdominal pain. Indigestion. Nausea and or vomiting. Bleeding.      Some patients with chronic gastritis and ulcers have been infected by a germ. They may need special testing. Medications which kill germs can be used to cure this condition.    Treatment includes avoiding the substances mentioned above that are known to cause stomach trouble.    Medications used to treat gastritis can include:  Antacids.  Medicines to control vomiting.   Acid blocking medicines (Axid, Pepcid, Prevacid, Prilosec, Tagamet, Zantac).     Symptoms of gastritis usually improve within 2-3 days of starting treatment. Call your caregiver if you are not better in a few days.    Seek medical care if you:    Have increased stomach pain or chest pain.  Vomit blood.  Faint or feel light headed. Can not keep fluids down.  Pass bloody or black stools.  Develop severe back pain.     Document Released: 12/18/2006  Document Re-Released: 06/30/2008  [employer]® Patient Information ©2008 [employer].